# Patient Record
Sex: MALE | URBAN - METROPOLITAN AREA
[De-identification: names, ages, dates, MRNs, and addresses within clinical notes are randomized per-mention and may not be internally consistent; named-entity substitution may affect disease eponyms.]

---

## 2022-05-24 ENCOUNTER — OFFICE VISIT (OUTPATIENT)
Dept: NEUROLOGY | Age: 52
End: 2022-05-24
Payer: COMMERCIAL

## 2022-05-24 VITALS
SYSTOLIC BLOOD PRESSURE: 140 MMHG | HEART RATE: 63 BPM | BODY MASS INDEX: 31.42 KG/M2 | HEIGHT: 72 IN | WEIGHT: 232 LBS | DIASTOLIC BLOOD PRESSURE: 82 MMHG

## 2022-05-24 DIAGNOSIS — R20.2 PARESTHESIA OF BOTH HANDS: Primary | ICD-10-CM

## 2022-05-24 DIAGNOSIS — G56.22 ULNAR NEUROPATHY AT ELBOW OF LEFT UPPER EXTREMITY: ICD-10-CM

## 2022-05-24 DIAGNOSIS — G56.03 CARPAL TUNNEL SYNDROME, BILATERAL: ICD-10-CM

## 2022-05-24 PROCEDURE — 95885 MUSC TST DONE W/NERV TST LIM: CPT | Performed by: PSYCHIATRY & NEUROLOGY

## 2022-05-24 PROCEDURE — 99203 OFFICE O/P NEW LOW 30 MIN: CPT | Performed by: PSYCHIATRY & NEUROLOGY

## 2022-05-24 PROCEDURE — 95913 NRV CNDJ TEST 13/> STUDIES: CPT | Performed by: PSYCHIATRY & NEUROLOGY

## 2022-05-24 ASSESSMENT — ENCOUNTER SYMPTOMS
RESPIRATORY NEGATIVE: 1
EYES NEGATIVE: 1
GASTROINTESTINAL NEGATIVE: 1

## 2022-05-24 ASSESSMENT — VISUAL ACUITY: OU: 1

## 2022-05-24 NOTE — PROGRESS NOTES
EMG/Nerve Conduction Study Procedure Note  [facility], [address] [place] [address] Lydia   [phone]      Hx:    Exam:     47 y.o. RH male referred for EMG/NCV of the upper extremities for bilateral hand numbness and tingling L>R. Hx of DM, highest A1c 6.8, last A1c 6.6    Tinel left wrist +. No elbow tinel. Summary                 1.      Controlled environmental factors / EMG lab. Temperature. 2. NCV : sensory segments:    Moderate bilateral median SCV slowed and attenuated SNAP amplitudes. Abnormal left ulnar SNAP = attenuated but no SCV slowing. Normal right ulnar and bilateral radial SCV SNAP. 3. NCV transcarpal sensory segments:    Abnormal = moderate to markedly slowed median transcarpal segment slowing with normal bilateral ulnar transcarpal segment SCV. Left Peak Difference at 1.10 msec and right at 0.96 msec --  UL at 0.20 msec. 4. NCV Motor MCV segments:     Abnormal = = prolonged TL left median at 4.50 msec (UL at 4.15 msec) with attenuated CMAP amplitudes. Slightly prolonged right median TL at 4.29 msec w mild/moderate attenuated CMAP. 5. F-wave studies:         Abnormal = = Prolonged left ulnar F-waves. Normal bilateral median f-waves. 6. H-REFLEX Studies:   Deferred. 7.  NEEDLE EMG:   Tested muscles[de-identified]    Left FCU + bilateral FDI APB ADM mm = = wnl.. Normal insertional activity and interference pattern/recruitment. No fasciculations fibrillations positive sharp waves. Normal MUP. No BSS AP. No giant MUP. No myotonia. No upper motor neuron sign. INTERPRETATION:   THESE FINDINGS ARE ELECTROPHYSIOLOGICALLY ABNORMAL COMPATIBLE WITH ENTRAPPED MEDIAN NERVE AT THE WRISTS AND CARPAL SEGMENTS BILATERALLY AS WELL AS EITHER ENTRAPMENT OR COMPRESSION OF THE LEFT ULNAR NERVE AT THE ELBOW. POSSIBLE EARLY CUBITAL TUNNEL SYNDROME ON THE LEFT DEPENDING ON COMPRESSION TYPE FEATURES ELSEWHERE. NO OTHER NEUROPATHY. NO MYOPATHY OR MYOTONIA.   NO OTHER DENERVATION. CONCLUSION:      Compatible with multiple abnormalities including bilateral left much more than right carpal tunnel syndrome as well as a slowed left ulnar nerve across the elbow which could be from compression abnormality. Procedure Details:       Findings are correlative to the history and examination = the left carpal tunnel is worse than the right. Both are moderate. The left ulnar slowing at the elbow suggests compression and avoidance if not elbow pads may be beneficial.  Patient made aware. Patient made fully aware. Both of the carpal segments could possibly fit for surgical [place]. Please Note[de-identified]     Data and waveforms * filed under Procedure category [facility]. See Procedure Files for complete data pages. Pankaj Leger MD  Consultative Neurology, Neurodiagnostics   [facility]    [facility]   [address]  Phone:  [phone]  Fax:   [phone]          + + +   Glossary:   MUP: motor unit potential;  SNAP: sensory nerve action potential; Fibs:  Fibrillations; Fascic: fasciculations; IA: insertional activity;  IP: interference pattern;  SCV :  Sensory conduction velocity;  MCV: motor conduction velocity; NOTE[de-identified] muscles are abbreviated latin initials. [place] raw datafile[de-identified]   * filed at Procedure or [employer].  *

## 2022-05-24 NOTE — LETTER
[facility],[address]  [address] [place] [address]  Phone: [phone]  Fax: [phone]           Dang Yeung MD      May 24, 2022     Patient: Jono Do   MR Number: [id number]   YOB: 1970   Date of Visit: 5/24/2022       Dear Dr. Paco Crouch: Thank you for referring Jono Do to me for evaluation/treatment. Below are the relevant portions of my assessment and plan of care. Bilateral carpal tunnel syndrome left much more than right but both of these are moderate. Could be also left entrapment or compression of the ulnar segment at the elbow. Only on the left. Patient made aware. If you have questions, please do not hesitate to call me. I look forward to following Angie Monahan along with you.     Sincerely,        Dang Yeung MD     providers:  Kevin Mederos, [address],[address]Th [address]  Via Fax: [address], [place]  [address] [place] [phone]  Via Fax: [phone]

## 2022-05-24 NOTE — PATIENT INSTRUCTIONS
Patient Education        Carpal Tunnel Release: Before Your Surgery  What is carpal tunnel release? Carpal tunnel surgery reduces the pressure on a nerve in the wrist. Your doctor will cut a ligament that presses on the nerve. This lets the nerve pass freely through the tunnel without being squeezed. This is also called carpal tunnelrelease surgery. The surgery can be open or endoscopic. In open surgery, your doctor makes a small cut in the palm of your hand. This cut is called an incision. In endoscopic surgery, your doctor makes one small incision in the wrist. Or you may have one small incision in the wrist and one in the palm. Your doctor puts a thin tube with a camera attached (endoscope) into the incision. Surgicaltools are put in along with the endoscope. In both types of surgeries, the incisions are closed with stitches. Theincisions leave scars that usually fade in time. You may be asleep during the surgery. Or you may be awake and have medicine tonumb your hand and arm so you won't feel pain. After surgery, your wrist and hand pain should start to go away. It usually takes 3 to 4 months to recover and 1 year before your hand strength returns. How much hand strength returns is different for each person. You will go home the same day as the surgery. When you can go back to workdepends on the type of work you do. Follow-up care is a key part of your treatment and safety. Be sure to make and go to all appointments, and call your doctor if you are having problems. It's also a good idea to know your test results and keep alist of the medicines you take. How do you prepare for surgery? Surgery can be stressful. This information will help you understand what youcan expect. And it will help you safely prepare for surgery. Preparing for surgery     Be sure you have someone to take you home.  Anesthesia and pain medicine will make it unsafe for you to drive or get home on your own.      Understand exactly what surgery is planned, along with the risks, benefits, and other options.      Tell your doctor ALL the medicines, vitamins, supplements, and herbal remedies you take. Some may increase the risk of problems during your surgery. Your doctor will tell you if you should stop taking any of them before the surgery and how soon to do it.      If you take aspirin or some other blood thinner, ask your doctor if you should stop taking it before your surgery. Make sure that you understand exactly what your doctor wants you to do. These medicines increase the risk of bleeding.      Make sure your doctor and the hospital have a copy of your advance directive. If you don't have one, you may want to prepare one. It lets others know your health care wishes. It's a good thing to have before any type of surgery or procedure. What happens on the day of surgery?  Follow the instructions exactly about when to stop eating and drinking. If you don't, your surgery may be canceled. If your doctor told you to take your medicines on the day of surgery, take them with only a sip of water.      Take a bath or shower before you come in for your surgery. Do not apply lotions, perfumes, deodorants, or nail polish.      Do not shave the surgical site yourself.      Take off all jewelry and piercings. And take out contact lenses, if you wear them. At the hospital or surgery center    Bring a picture ID.      The area for surgery is often marked to make sure there are no errors.      You will be kept comfortable and safe by your anesthesia provider. The anesthesia may make you sleep. Or it may just numb the area being worked on.      The surgery will take about 15 to 60 minutes. When should you call your doctor?     You have questions or concerns.      You don't understand how to prepare for your surgery.      You become ill before the surgery (such as fever, flu, or a cold).      You need to reschedule or have changed your mind about having the surgery. Where can you learn more? Go to [email or web address] and sign in to your [employer] account. Enter S771 in the [employer] box to learn more about \"Carpal Tunnel Release: Before Your Surgery. \"     If you do not have an account, please click on the \"Sign Up Now\" link. Current as of: July 1, 2021               Content Version: 13.2  © 2006-2022 Healthwise, Incorporated. Care instructions adapted under license by [facility] ([facility]). If you have questions about a medical condition or this instruction, always ask your healthcare professional. [address] any warranty or liability for your use of this information.

## 2022-05-24 NOTE — PROGRESS NOTES
5/24/2022  Daniel Pringle     Patient is referred by the following provider for consultation regarding as below:    Hand problems worse left                                                                 For EMG NCV testing /exam.     Dear    Dr Chago Garnica    PCP                                   NEUROLOGY     CONSULTATION                   Chief Complaint:  Hand numbness Eileen Acosta  L > R             46 yo man w numbness hands // hx + DM2. .   A1c < 7. A      Right  handed 47 y.o.       male       * I reviewed the available and pertinent records - including eHR and Care Everywhere - notes of PMHx, PSHx, Fam Hx, and  and have examined patient with the following findings:   15 min. IMAGING REVIEW:  I REVIEWED PERTINENT  IMAGES AND REPORTS WITH THE PATIENT PERSONALLY, DIRECTLY AND FULLY. 15 extra  MINUTES. Past Medical History:  DM2    Past Surgical History:  No past surgical history on file. Social History:  Social History     Socioeconomic History    Marital status:      Spouse name: Not on file    Number of children: Not on file    Years of education: Not on file    Highest education level: Not on file   Occupational History    Not on file   Tobacco Use    Smoking status: Not on file    Smokeless tobacco: Not on file   Substance and Sexual Activity    Alcohol use: Not on file    Drug use: Not on file    Sexual activity: Not on file   Other Topics Concern    Not on file   Social History Narrative    Not on file     Social Determinants of Health     Financial Resource Strain:     Difficulty of Paying Living Expenses: Not on file   Food Insecurity:     Worried About Running Out of Food in the Last Year: Not on file    Felix of Food in the Last Year: Not on file   Transportation Needs:     Lack of Transportation (Medical): Not on file    Lack of Transportation (Non-Medical):  Not on file   Physical Activity:     Days of Exercise per Week: Not on file    Minutes of Exercise per Session: Not on file   Stress:     Feeling of Stress : Not on file   Social Connections:     Frequency of Communication with Friends and Family: Not on file    Frequency of Social Gatherings with Friends and Family: Not on file    Attends [employer] Services: Not on file    Active Member of [address] [place] [address] or Organizations: Not on file    Attends Club or Organization Meetings: Not on file    Marital Status: Not on file   Intimate Partner Violence:     Fear of Current or Ex-Partner: Not on file    Emotionally Abused: Not on file    Physically Abused: Not on file    Sexually Abused: Not on file   Housing Stability:     Unable to Pay for Housing in the Last Year: Not on file    Number of [place] in the Last Year: Not on file    Unstable Housing in the Last Year: Not on file       Family History:   No neurologic dis in fam.     Medications:    No specific for neurol. Review of Systems:  Review of Systems   Constitutional: Negative. HENT: Negative. Eyes: Negative. Respiratory: Negative. Gastrointestinal: Negative. Musculoskeletal: Negative. Neurological: Positive for weakness (hands) and numbness. Negative for dizziness, tremors, seizures, syncope, facial asymmetry, speech difficulty, light-headedness and headaches. Predominantly left hand weakness/numbness   Hematological: Negative. Psychiatric/Behavioral: Negative. All other systems reviewed and are negative. Extended / Orthostatic Vitals:    Vitals:    05/24/22 1313   BP: (!) 140/82   Site: Left Upper Arm   Pulse: 63   Weight: 232 lb (105.2 kg)   Height: 6' (1.829 m)        Physical Exam  Vitals reviewed. Constitutional:       General: He is awake. He is not in acute distress. Appearance: He is well-developed and well-groomed. He is not ill-appearing, toxic-appearing or diaphoretic. HENT:      Head: Normocephalic and atraumatic. No raccoon eyes, abrasion, contusion, right periorbital erythema or left periorbital erythema. Right Ear: Hearing normal.      Left Ear: Hearing normal.   Eyes:      General: Lids are normal. Vision grossly intact. No visual field deficit or scleral icterus. Right eye: No discharge. Left eye: No discharge. Extraocular Movements: Extraocular movements intact. Right eye: Normal extraocular motion and no nystagmus. Left eye: Normal extraocular motion and no nystagmus. Conjunctiva/sclera: Conjunctivae normal.      Right eye: Right conjunctiva is not injected. Left eye: Left conjunctiva is not injected. Pupils: Pupils are equal, round, and reactive to light. Neck:      Trachea: Phonation normal.      Comments: No Spurling sign. No Lhermitte sign. Pulmonary:      Effort: Pulmonary effort is normal. No respiratory distress. Breath sounds: No wheezing. Musculoskeletal:         General: No swelling, tenderness, deformity or signs of injury. Normal range of motion. Cervical back: Normal range of motion. No rigidity or torticollis. Normal range of motion. Right lower leg: No edema. Left lower leg: No edema. Skin:     General: Skin is warm and dry. Capillary Refill: Capillary refill takes less than 2 seconds. Coloration: Skin is not cyanotic, jaundiced or pale. Nails: There is no clubbing. Neurological:      Mental Status: He is alert, oriented to person, place, and time and easily aroused. Mental status is at baseline. Cranial Nerves: No cranial nerve deficit, dysarthria or facial asymmetry. Sensory: Sensory deficit present. Motor: No weakness, tremor, atrophy, abnormal muscle tone or seizure activity. Coordination: Coordination is intact. Coordination normal.      Gait: Gait is intact. Gait normal.      Deep Tendon Reflexes: Reflexes are normal and symmetric. Reflexes normal.      Comments: No Lele.   No Maria Fernanda. No Gerstmann. No glabellar. No rigidity or spasticity. No atrophy or fasciculations. Psychiatric:         Attention and Perception: Attention normal.         Mood and Affect: Mood normal.         Speech: Speech normal.         Behavior: Behavior normal. Behavior is cooperative. Neurologic Exam     Mental Status   Oriented to person, place, and time. Attention: normal. Concentration: normal.   Speech: speech is normal   Level of consciousness: alert  Knowledge: good and consistent with education. Able to perform simple calculations. Able to name object. Normal comprehension. Cranial Nerves   Cranial nerves II through XII intact. CN III, IV, VI   Pupils are equal, round, and reactive to light. Motor Exam   Muscle bulk: normal  Overall muscle tone: normal    Sensory Exam   Right arm light touch: decreased from fingers  Left arm light touch: decreased from fingers  Right leg light touch: normal  Left leg light touch: normal  Vibration normal.   Right arm pinprick: decreased from fingers  Left arm pinprick: decreased from fingers    Gait, Coordination, and Reflexes     Gait  Gait: normal    Tremor   Resting tremor: absent  Intention tremor: absent  Action tremor: absent    Reflexes   Right Marrero: absent  Left Marrero: absent  There is no tic, twitch, tonic or clonic activity noted. Assessment   Assessment / Plan:    Diagnoses and all orders for this visit:    Paresthesia of both hands    1. Please see NCV EMG report. ..      bilateral CTS L >> R. .     Left is moderate. Right CTS is minimal / early. The Diagnosis and differential diagnostic considerations, and Rx Tx were reviewed with the patient at length.    CTS. .    Otherwise good exam.    I have spent greater than 50% of visit discussing and counseling of patient 30 min visit for treatment and diagnostic plan review. More than 50% of this visit  time was spent in counseling and care coordination.   The above time includes pre-  and post- face-face time in records review, and preparation including available pertinent images and reports. Notes: Patient is to continue all medications as directed by prescribing physicians. Continuations on today's visit are made based on the patient's report of current medications. Patient acknowledges the above examination and reviews. Current Meds Verified: Current meds/immunizations reviewed, including purpose with pt. Med Recon list given to pt/family. Pt advised to discard old med lists and provide all providers with current list at each visit and carry list with them in case of emergency. [ *NOTE:  parts or all of this consultation are produced using artificial voice recognition software.   Some speech errors are inherent in such software and may be included in the produced record. ]                Neil Morgan MD  Consultative Neurology, [facility] [address] [place] [address], [address] [place] [address]  Phone:  [phone]  Fax:   [phone]